# Patient Record
Sex: FEMALE | Race: WHITE | NOT HISPANIC OR LATINO
[De-identification: names, ages, dates, MRNs, and addresses within clinical notes are randomized per-mention and may not be internally consistent; named-entity substitution may affect disease eponyms.]

---

## 2024-09-09 ENCOUNTER — DASHBOARD ENCOUNTERS (OUTPATIENT)
Age: 64
End: 2024-09-09

## 2024-09-09 ENCOUNTER — OFFICE VISIT (OUTPATIENT)
Dept: URBAN - METROPOLITAN AREA CLINIC 78 | Facility: CLINIC | Age: 64
End: 2024-09-09
Payer: COMMERCIAL

## 2024-09-09 VITALS
BODY MASS INDEX: 19.61 KG/M2 | TEMPERATURE: 98 F | DIASTOLIC BLOOD PRESSURE: 70 MMHG | SYSTOLIC BLOOD PRESSURE: 104 MMHG | HEART RATE: 82 BPM | WEIGHT: 122 LBS | HEIGHT: 66 IN

## 2024-09-09 DIAGNOSIS — Z86.010 PERSONAL HISTORY OF COLONIC POLYPS: ICD-10-CM

## 2024-09-09 DIAGNOSIS — Z80.0 FAMILY HX OF COLON CANCER: ICD-10-CM

## 2024-09-09 DIAGNOSIS — R10.9 ABDOMINAL PAIN: ICD-10-CM

## 2024-09-09 PROCEDURE — 99244 OFF/OP CNSLTJ NEW/EST MOD 40: CPT | Performed by: INTERNAL MEDICINE

## 2024-09-09 PROCEDURE — 99204 OFFICE O/P NEW MOD 45 MIN: CPT | Performed by: INTERNAL MEDICINE

## 2024-09-09 RX ORDER — PROGESTERONE 100 MG/1
TAKE 1 CAPSULE BY MOUTH EVERY DAY AT BEDTIME CAPSULE ORAL
Qty: 90 EACH | Refills: 0 | Status: ACTIVE | COMMUNITY

## 2024-09-09 RX ORDER — CELECOXIB 200 MG/1
TAKE 1 CAPSULE BY MOUTH IN THE MORNING CAPSULE ORAL
Qty: 90 EACH | Refills: 1 | Status: ACTIVE | COMMUNITY

## 2024-09-09 RX ORDER — ESTRADIOL 1 MG/1
TAKE 1 TABLET BY MOUTH DAILY TABLET ORAL
Qty: 90 EACH | Refills: 1 | Status: ACTIVE | COMMUNITY

## 2024-09-09 NOTE — HPI-TODAY'S VISIT:
The patient presents on referral from Shanae Hung MD, for a gastroenterology evaluation for abdominal pain. A copy of this document will be sent to the referring provider.  She was last seen by Libia Wong in 2016 for loose stools.  Last Friday she felt very sick soon after eating. She was having a lot of pain in the periumbilical region.  No nausea or vomiting. She will experience occasional diarrhea, but for the most part she has been having very regular BMs on a daily basis.  She underwent US of the abdomen this AM, hence she does not have those results yet.  Her last colonoscopy was done in 2022 at which time she had a polyp removed. There is a FH of colon polyps. Her niece was just diagnosed with colon cancer at age 50.   There is no recent history of rectal bleeding. The patient has no pertinent additional complaints of abdominal pain, constipation, diarrhea, bloating, rectal pain, anorexia or unintentional weight loss.       Regarding any upper GI complaints, the patient has not had heartburn, nausea, vomiting or dysphagia.         The patient does not take blood thinners.       They deny any CP or NGO.     Summary fof prior workup: - Colonoscopy 11/15/22 by Crow Peña: 5mm sigm polyp Good prep. - Colonoscopy ~2012 revealed a polyp in the transverse colon. She was given a 5 year recall interval

## 2024-09-24 PROBLEM — 428283002: Status: ACTIVE | Noted: 2024-09-24
